# Patient Record
Sex: FEMALE | Race: WHITE
[De-identification: names, ages, dates, MRNs, and addresses within clinical notes are randomized per-mention and may not be internally consistent; named-entity substitution may affect disease eponyms.]

---

## 2019-02-13 PROBLEM — Z00.00 ENCOUNTER FOR PREVENTIVE HEALTH EXAMINATION: Status: ACTIVE | Noted: 2019-02-13

## 2019-04-02 ENCOUNTER — APPOINTMENT (OUTPATIENT)
Dept: NEPHROLOGY | Facility: CLINIC | Age: 69
End: 2019-04-02
Payer: COMMERCIAL

## 2019-04-02 DIAGNOSIS — Z78.9 OTHER SPECIFIED HEALTH STATUS: ICD-10-CM

## 2019-04-02 PROCEDURE — 99244 OFF/OP CNSLTJ NEW/EST MOD 40: CPT

## 2019-04-02 RX ORDER — HYDROCHLOROTHIAZIDE 25 MG/1
25 TABLET ORAL
Qty: 90 | Refills: 3 | Status: ACTIVE | COMMUNITY
Start: 2019-04-02

## 2019-04-02 RX ORDER — ELECTROLYTES/DEXTROSE
SOLUTION, ORAL ORAL DAILY
Refills: 0 | Status: ACTIVE | COMMUNITY
Start: 2019-04-02

## 2019-04-02 RX ORDER — ROSUVASTATIN CALCIUM 20 MG/1
20 TABLET, FILM COATED ORAL DAILY
Refills: 0 | Status: ACTIVE | COMMUNITY
Start: 2019-04-02

## 2019-04-02 RX ORDER — AMLODIPINE BESYLATE 5 MG/1
5 TABLET ORAL
Qty: 90 | Refills: 3 | Status: ACTIVE | COMMUNITY
Start: 2019-04-02

## 2019-04-02 RX ORDER — PNV NO.95/FERROUS FUM/FOLIC AC 28MG-0.8MG
100 TABLET ORAL DAILY
Qty: 30 | Refills: 3 | Status: ACTIVE | COMMUNITY
Start: 2019-04-02

## 2019-04-07 VITALS — DIASTOLIC BLOOD PRESSURE: 60 MMHG | SYSTOLIC BLOOD PRESSURE: 140 MMHG

## 2019-04-07 NOTE — ASSESSMENT
[FreeTextEntry1] : # stable CKD 3 - Cr 1.2 > 1.5 > 1.5 in jan 2019\par - likely due to hypertensive nephrosclerosis\par - US showed possibilty of "moderate"bilateral renal artery stenosis -with relatively small kidneys--  right kidney 8.8 cm, left kidney 9.6 cm\par - no proteinuria\par - lisinopril was stopped 3 weeks ago -- was it because of sono report of possible CINDY? renal fxn has remained stable since 7/2019 so doesn’t seem to need to stop ACEI geovany if working well for BP \par -might consider MRA to further evaluate for CINDY geovany if creat rises further-- or HTN uncontrolled -- meanwhile asked pt to f/u in 2-3 mos-- recheck chem to assure stable and follow BP \par \par # HTN\par - /60 today - patient reports she is feeling anxious as she is heading to nursing home \par to visit her mother \par - On amlodipine and HCTZ-- if BP has worsened off ACEI would consider restart as above\par also reviewed lifestyle modification for HTN incl low na intake, limit processed foods which have a lot of na, exercise\par

## 2019-04-07 NOTE — PHYSICAL EXAM
[General Appearance - In No Acute Distress] : in no acute distress [Jugular Venous Distention Increased] : there was no jugular-venous distention [Heart Sounds] : normal S1 and S2 [Edema] : there was no peripheral edema [Abdomen Soft] : soft [Abdomen Tenderness] : non-tender [No CVA Tenderness] : no ~M costovertebral angle tenderness [Involuntary Movements] : no involuntary movements were seen [] : no rash [No Focal Deficits] : no focal deficits [Oriented To Time, Place, And Person] : oriented to person, place, and time [Affect] : the affect was normal [General Appearance - Alert] : alert [Sclera] : the sclera and conjunctiva were normal [Auscultation Breath Sounds / Voice Sounds] : lungs were clear to auscultation bilaterally [Heart Rate And Rhythm] : heart rate was normal and rhythm regular [Heart Sounds Pericardial Friction Rub] : no pericardial rub [Abnormal Walk] : normal gait

## 2019-04-07 NOTE — HISTORY OF PRESENT ILLNESS
[FreeTextEntry1] : Patient was referred from PCP for elevated Cr of 1.5 \par Pt with hx HTN x years -CKD most recent creat noted to be 1.5 . Cr was 1.49 in labs from July 2018. -- was 1.2 in 2/1018 \par UAs unremarkable\par a renal sono showed relatively small kidneys with possibility of moderate CINDY b/l \par She has a h/o hypertension. BP is reportedly 120s to 130s  at home, however /60 in office today.\par \par Patient stated that she was taking Lisinopril for years, however, this was stopped 3 weeks ago and switched to HCTZ- uncertain why \par also on amodipine\par \par No shortness of breath, no leg swelling, no dizziness.\par \par Patient stated that she lost 50-60 lbs over 2-3 years.\par \par PCP Dr Vy Villalobos

## 2019-04-07 NOTE — CONSULT LETTER
[Dear  ___] : Dear  [unfilled], [Consult Letter:] : I had the pleasure of evaluating your patient, [unfilled]. [Please see my note below.] : Please see my note below. [Consult Closing:] : Thank you very much for allowing me to participate in the care of this patient.  If you have any questions, please do not hesitate to contact me. [Sincerely,] : Sincerely, [FreeTextEntry3] : Leif Martínez MD, [facility]\par Division of Nephrology\par [facility]\par  of Medicine\par [employer] \par \par \par \par \par

## 2019-07-12 ENCOUNTER — APPOINTMENT (OUTPATIENT)
Dept: NEPHROLOGY | Facility: CLINIC | Age: 69
End: 2019-07-12
Payer: COMMERCIAL

## 2019-07-12 VITALS
HEIGHT: 61 IN | SYSTOLIC BLOOD PRESSURE: 138 MMHG | BODY MASS INDEX: 24.55 KG/M2 | DIASTOLIC BLOOD PRESSURE: 62 MMHG | WEIGHT: 130 LBS | HEART RATE: 84 BPM

## 2019-07-12 DIAGNOSIS — N18.3 CHRONIC KIDNEY DISEASE, STAGE 3 (MODERATE): ICD-10-CM

## 2019-07-12 DIAGNOSIS — I10 ESSENTIAL (PRIMARY) HYPERTENSION: ICD-10-CM

## 2019-07-12 PROCEDURE — 99213 OFFICE O/P EST LOW 20 MIN: CPT

## 2019-07-12 RX ORDER — LISINOPRIL 20 MG/1
20 TABLET ORAL DAILY
Qty: 30 | Refills: 5 | Status: ACTIVE | COMMUNITY
Start: 2019-07-12

## 2019-07-12 NOTE — PHYSICAL EXAM
[General Appearance - In No Acute Distress] : in no acute distress [General Appearance - Alert] : alert [Jugular Venous Distention Increased] : there was no jugular-venous distention [Auscultation Breath Sounds / Voice Sounds] : lungs were clear to auscultation bilaterally [Sclera] : the sclera and conjunctiva were normal [Murmurs] : no murmurs [Heart Sounds Gallop] : no gallops [Heart Rate And Rhythm] : heart rate was normal and rhythm regular [Edema] : there was no peripheral edema [Heart Sounds Pericardial Friction Rub] : no pericardial rub [Abdomen Soft] : soft [No CVA Tenderness] : no ~M costovertebral angle tenderness [Abdomen Tenderness] : non-tender [Involuntary Movements] : no involuntary movements were seen [] : no rash [Oriented To Time, Place, And Person] : oriented to person, place, and time [Affect] : the affect was normal [No Focal Deficits] : no focal deficits

## 2019-07-14 NOTE — HISTORY OF PRESENT ILLNESS
[FreeTextEntry1] : f/u CKD\par has been feelling well \par has been stressed with mother who passed away in May and  with lung ca -s/p lobectomy \par BP has been good -- around 120/70 at home and work \par reports still on lisinopril -- med that had been held was anti-inflammatory \par hasn’t done bloodwork yet \par PCP Dr Villalobos\par

## 2019-07-14 NOTE — ASSESSMENT
[FreeTextEntry1] : CKD 3 has bee stable - no recent labs \par BP acceptable and reports better at home -- is on ACEI - cont \par check labs incl chem , UA, urine microalb\par cont regimen\par f/u 6 mos if ok\par

## 2019-07-15 LAB
25(OH)D3 SERPL-MCNC: 30.5 NG/ML
ALBUMIN SERPL ELPH-MCNC: 4.8 G/DL
ALP BLD-CCNC: 71 U/L
ALT SERPL-CCNC: 15 U/L
ANION GAP SERPL CALC-SCNC: 15 MMOL/L
APPEARANCE: CLEAR
AST SERPL-CCNC: 19 U/L
BACTERIA: NEGATIVE
BASOPHILS # BLD AUTO: 0.03 K/UL
BASOPHILS NFR BLD AUTO: 0.4 %
BILIRUB SERPL-MCNC: 0.2 MG/DL
BILIRUBIN URINE: NEGATIVE
BLOOD URINE: NEGATIVE
BUN SERPL-MCNC: 27 MG/DL
CALCIUM SERPL-MCNC: 9.2 MG/DL
CALCIUM SERPL-MCNC: 9.5 MG/DL
CHLORIDE SERPL-SCNC: 99 MMOL/L
CO2 SERPL-SCNC: 24 MMOL/L
COLOR: YELLOW
CREAT SERPL-MCNC: 1.25 MG/DL
CREAT SPEC-SCNC: 155 MG/DL
EOSINOPHIL # BLD AUTO: 0.07 K/UL
EOSINOPHIL NFR BLD AUTO: 0.8 %
GLUCOSE QUALITATIVE U: NEGATIVE
GLUCOSE SERPL-MCNC: 85 MG/DL
HCT VFR BLD CALC: 34.3 %
HGB BLD-MCNC: 10.7 G/DL
HYALINE CASTS: 2 /LPF
IMM GRANULOCYTES NFR BLD AUTO: 0.2 %
KETONES URINE: NEGATIVE
LEUKOCYTE ESTERASE URINE: NEGATIVE
LYMPHOCYTES # BLD AUTO: 1.69 K/UL
LYMPHOCYTES NFR BLD AUTO: 20.3 %
MAGNESIUM SERPL-MCNC: 2.1 MG/DL
MAN DIFF?: NORMAL
MCHC RBC-ENTMCNC: 29.6 PG
MCHC RBC-ENTMCNC: 31.2 GM/DL
MCV RBC AUTO: 95 FL
MICROALBUMIN 24H UR DL<=1MG/L-MCNC: 1.5 MG/DL
MICROALBUMIN/CREAT 24H UR-RTO: 10 MG/G
MICROSCOPIC-UA: NORMAL
MONOCYTES # BLD AUTO: 0.52 K/UL
MONOCYTES NFR BLD AUTO: 6.2 %
NEUTROPHILS # BLD AUTO: 6 K/UL
NEUTROPHILS NFR BLD AUTO: 72.1 %
NITRITE URINE: NEGATIVE
PARATHYROID HORMONE INTACT: 45 PG/ML
PH URINE: 6
PHOSPHATE SERPL-MCNC: 3.4 MG/DL
PLATELET # BLD AUTO: 250 K/UL
POTASSIUM SERPL-SCNC: 3.9 MMOL/L
PROT SERPL-MCNC: 7.4 G/DL
PROTEIN URINE: NORMAL
RBC # BLD: 3.61 M/UL
RBC # FLD: 13.2 %
RED BLOOD CELLS URINE: 3 /HPF
SODIUM SERPL-SCNC: 138 MMOL/L
SPECIFIC GRAVITY URINE: 1.02
SQUAMOUS EPITHELIAL CELLS: 4 /HPF
URATE SERPL-MCNC: 5.3 MG/DL
UROBILINOGEN URINE: NORMAL
WBC # FLD AUTO: 8.33 K/UL
WHITE BLOOD CELLS URINE: 2 /HPF

## 2020-01-14 ENCOUNTER — APPOINTMENT (OUTPATIENT)
Dept: NEPHROLOGY | Facility: CLINIC | Age: 70
End: 2020-01-14